# Patient Record
(demographics unavailable — no encounter records)

---

## 2024-11-10 NOTE — HISTORY OF PRESENT ILLNESS
[FreeTextEntry1] : 2024: SANTHOSH ROACH is a 36 year-old M who presents with low T over time and he has no interest in having children.  He started TRT and had his first injection of testosterone cypionate on 2023  He returns today for a follow up.  He had been doing injections at home, aided by wife who is a nurse He had his last injection on 8/15/2024  He notices slightly improved sex drive since last visit  Testosterone: - 8/15/2024 495 - 3/4/2024 218 - 10/6/2023 242 - 2023 210  H&H test on 3/4/2024 was done x1 day after testosterone injection.  HGB: 16.6 g/dL HCT: 52.3%  H&H test on 8/15/2024 was done x1 day before testosterone injection.  HB.3 g/dL HCT: 49.3%  2023 PSA 0.25 ng/mL

## 2024-11-10 NOTE — ASSESSMENT
[FreeTextEntry1] : Testosterone, H&H mid December We discussed the appropriate time for his H&H and understands to have it done 2-3 days before his next testosterone injection.   PSA  Pt will have lab workup with PCP in Jan/Feb   Order sent to replenish 22G and 18G Luer-Lock syringe supply for drawing up med and for administration  Aware of risks and need for repeat/regular blood work. We did talk about risk of "thick blood" and major cardiac events if not given and monitored appropriately, but overall major events are not increased.  Also, understands that IF a pt has prostate cancer, TRT can stimulate growth/progression of it.

## 2024-11-10 NOTE — ADDENDUM
[FreeTextEntry1] : This note was partly authored by Sam Radford working as a scribe for VIGNESH Cannon. I, VIGNESH Cannon, have reviewed the content of this note and confirm it is true and accurate. I personally performed the history and physical examination and made all the decisions. 11/07/2024.

## 2024-11-10 NOTE — HISTORY OF PRESENT ILLNESS
[FreeTextEntry1] : 2024: SANTHOSH ROACH is a 36 year-old M who presents with low T over time and he has no interest in having children.  He started TRT and had his first injection of testosterone cypionate on 2023  He returns today for a follow up.  He had been doing injections at home, aided by wife who is a nurse He had his last injection on 8/15/2024  He notices slightly improved sex drive since last visit  Testosterone: - 8/15/2024 495 - 3/4/2024 218 - 10/6/2023 242 - 2023 210  H&H test on 3/4/2024 was done x1 day after testosterone injection.  HGB: 16.6 g/dL HCT: 52.3%  H&H test on 8/15/2024 was done x1 day before testosterone injection.  HB.3 g/dL HCT: 49.3%  2023 PSA 0.25 ng/mL none

## 2025-03-09 NOTE — HISTORY OF PRESENT ILLNESS
[FreeTextEntry1] : 2025:  SANTHOSH ROACH is a 36-year-old Male who presents with low T over time and he has no interest in having more children.  He has two male children.  He started TRT and had his first injection of testosterone cypionate on 2023  He returns today for a follow up.  No medical changes since last visit.  He had been doing injections at home, aided by wife who is a nurse Last Injection was 25   Testosterone: - 25 791  - 8/15/2024 495 - 3/4/2024 218 - 10/6/2023 242 - 2023 210  H&H test on 2025 was done x2 day after testosterone injection.  HGB: 17.3 g/dL (elevated) HCT: 52.4% (elevated)  H&H test on 8/15/2024 was done x1 day before testosterone injection.  HB.3 g/dL HCT: 49.3%  PSA 0.24 ng/mL on 25 (stable)

## 2025-03-09 NOTE — ADDENDUM
[FreeTextEntry1] : This note was partly authored by Adan Banegas working as a scribe for VIGNESH Cannon. I, VIGNESH Cannon, have reviewed the content of this note and confirm it is true and accurate. I personally performed the history and physical examination and made all the decisions. 03/06/2025.

## 2025-03-09 NOTE — ASSESSMENT
[FreeTextEntry1] : Pt encouraged to see Hematology for elevated H&H H&H test on 2/27/2025 was done x2 day after testosterone injection.  HGB: 17.3 g/dL (elevated) HCT: 52.4% (elevated)  Repeat blood work today (testosterone and H&H)  Aware of risks and need for repeat/regular blood work. We did talk about risk of "thick blood" and major cardiac events if not given and monitored appropriately, but overall major events are not increased.  Also, understands that IF a pt has prostate cancer, TRT can stimulate growth/progression of it.  gave patient a referral to hematology

## 2025-03-14 NOTE — ASSESSMENT
[FreeTextEntry1] : 36 year old M with Pmhx ADHD and low testosterone, nicotine vaping  Presents for an evaluation of Erythrocytosis  He reports he has started to take testosterone levels due to low libido. He stopped last week to see if HGb HCT improve.  he is planning to start again on monday 3/17.  Overall he feels ok .  he was already referred to do a sleep study but due to timing he has not gone for consult.  He also vapes nicotine everyday.  most recent labs Mar 6  were HGB 17.3 HCT 51.4 %  and 2/27  hgb 17.3 HCT 52.4   Problem # 1 Erythrocytosis -of unknown etiology at this time, but the differential diagnosis includes mainly primary (proliferative) and secondary (reactive) causes - laboratory work-up also included hemochromatosis gene analysis, EPO, and testing for JAK2 mutation, MPN panel, testosterone levels. -CBC will be repeated at next visit -further management decisions including no intervention, prn phlebotomies, and oral pharmacotherapy will be based on pending results -also recommending sleep study to r.o CHERIE due to obesity.  -tasked [facility] email   lab orders and [facility] locations .  will call with results

## 2025-03-14 NOTE — HISTORY OF PRESENT ILLNESS
[de-identified] : cc: erythroytosis  36 year old M with Pmhx ADHD and low testosterone, nicotine vaping  Presents for an evaluation of Erythrocytosis  He reports he has started to take testosterone levels due to low libido. He stopped last week to see if HGb HCT improve.  he is planning to start again on monday 3/17.  Overall he feels ok .  he was already referred to do a sleep study but due to timing he has not gone for consult.  He also vapes nicotine everyday.  most recent labs Mar 6  were HGB 17.3 HCT 51.4 %  and   hgb 17.3 HCT 52.4    Denies excessive or spontaneous bleeding or bruising. Denies  enlarged nodes. Denies  dysuria,  nocturia,  hesitancy,  urgency, oliguria,  hematuria, incontinence. Denies  nausea,  vomiting,  diarrhea,  Constipation,  heartburn, abdominal pain,  jaundice, melena, blood in stool. Denies  palpitations,  dyspnea, orthopnea, PND, Denies claudication,  edema,  varicose veins, Denies Fever, rigors,  diaphoresis.  Denies anorexia,  weight loss, sleep disturbance.  Denies resting dyspnea, exertional dyspnea, wheezing, cough,  stridor,  hemoptysis, Denies rash,  pruritus,  skin lesions, Denies bone pain,  Myalgia,  arthralgia,   joint swelling,  limited range of motion, Patient does not complain of frequent or severe infections. Patient does not complain of any allergic reactions.   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   HCM:  Dr Leila Rodriguez- urologist  Dr Christoph rosario PCP sees  once a year colonoscopy/EGD : none COVID vax: yes  COVID infection: yes

## 2025-03-14 NOTE — REASON FOR VISIT
[Home] : at home, [unfilled] , at the time of the visit. [Other Location: e.g. Home (Enter Location, City,State)___] : at [unfilled] [Telehealth (audio & video)] : This visit was provided via telehealth using real-time 2-way audio visual technology. [Verbal consent obtained from patient] : the patient, [unfilled] [Initial Consultation] : an initial consultation for

## 2025-03-24 NOTE — REASON FOR VISIT
[Home] : at home, [unfilled] , at the time of the visit. [Other Location: e.g. Home (Enter Location, City,State)___] : at [unfilled] [Telehealth (audio & video)] : This visit was provided via telehealth using real-time 2-way audio visual technology. [Verbal consent obtained from patient] : the patient, [unfilled] [Follow-Up Visit] : a follow-up visit for [FreeTextEntry2] : erythrocytosis

## 2025-03-24 NOTE — HISTORY OF PRESENT ILLNESS
[de-identified] : cc: erythroytosis 36 year old M with Pmhx ADHD and low testosterone, nicotine vaping Presents for an evaluation of Erythrocytosis He reports he has started to take testosterone due to low libido and low testerone levels . He stopped last week to see if HGb HCT improve. he is planning to start again on monday 3/17. Overall he feels ok. he was already referred to do a sleep study but due to timing he has not gone for consult. He also vapes nicotine everyday. most recent labs Mar 6 were HGB 17.3 HCT 51.4 % and  hgb 17.3 HCT 52.4  Denies excessive or spontaneous bleeding or bruising. Denies enlarged nodes. Denies dysuria, nocturia, hesitancy, urgency, oliguria, hematuria, incontinence. Denies nausea, vomiting, diarrhea, Constipation, heartburn, abdominal pain, jaundice, melena, blood in stool. Denies palpitations, dyspnea, orthopnea, PND, Denies claudication, edema, varicose veins, Denies Fever, rigors, diaphoresis. Denies anorexia, weight loss, sleep disturbance. Denies resting dyspnea, exertional dyspnea, wheezing, cough, stridor, hemoptysis, Denies rash, pruritus, skin lesions, Denies bone pain, Myalgia, arthralgia, joint swelling, limited range of motion, Patient does not complain of frequent or severe infections. Patient does not complain of any allergic reactions.  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 HCM: Dr Leila Rodriguez- urologist Dr Christoph rosario PCP sees once a year colonoscopy/EGD : none COVID vax: yes COVID infection: yes. [de-identified] : 3/24/25 pt presents for fu for work up of erythrocytosis

## 2025-03-24 NOTE — ASSESSMENT
[FreeTextEntry1] : 36 year old M with Pmhx ADHD and low testosterone, nicotine vaping Presents for an evaluation of Erythrocytosis He reports he has started to take testosterone levels due to low libido. He stopped last week to see if HGb HCT improve. he is planning to start again on monday 3/17. Overall he feels ok. he was already referred to do a sleep study but due to timing he has not gone for consult. He also vapes nicotine everyday. most recent labs Mar 6 were HGB 17.3 HCT 51.4 % and 2/27 hgb 17.3 HCT 52.4  Problem # 1 Erythrocytosis -of unknown etiology at this time, but the differential diagnosis includes mainly primary (proliferative) and secondary (reactive) causes - laboratory work-up also included hemochromatosis gene analysis, EPO, and testing for JAK2 mutation, MPN panel, testosterone levels. -CBC will be repeated at next visit -further management decisions including no intervention, prn phlebotomies, and oral pharmacotherapy will be based on pending results -also recommending sleep study to r.o CHERIE due to obesity. -tasked [facility] email lab orders and [facility] locations. will call with results.  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  - Pt is aware of results and referral to Dr Segundo team .

## 2025-05-07 NOTE — ASSESSMENT
[FreeTextEntry1] : 36-year-old M with h/o ADHD and low testosterone, nicotine vaping presents for an evaluation of Erythrocytosis He reports he has started to take testosterone levels due to low libido. 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 Patient wants to continue Testosterone.    [] Secondary vs Primary polycythemia  - Unlikely to be MPL positive PV as PV is almost entirely JAK2 positive disease/condition - Low EPO level supports PV (primary) - Testosterone induced Erythrocytosis cannot be ruled out - H63D HFE mutation can also cause Erythrocytosis  - Any combination of the three can potentially be the etiology - Obtain Ferritin and iron studies  - May be a candidate for phlebotomy or blood donor  - RTC in 4 months

## 2025-05-07 NOTE — ASSESSMENT
[FreeTextEntry1] : I reached out to Hematology to see if any more detailed recommendations. I have ordered a T, CBC and PSA for 6 wks from now. He has rec'd refills for his syringes to draw the testosterone and for injection purposes.  Testosterone cypionate 200 mg/ml was prescribed:  he injects 0.5 ml q 7 d   Will call him w results.   He is also inquiring about vasectomy. It has been quite some time since I examined him and would need to see him and also obtain consent for the 30 d waiting period. He may need it done under sedation due to his body habitus.  Please note that he was at his place of residence and I was in the office at [facility] in [place]. Consent was given at beginning of call for a telehealth visit.

## 2025-05-07 NOTE — HISTORY OF PRESENT ILLNESS
[de-identified] : CHART REVIEW on 2025  cc: erythroytosis 36 year old M with Pmhx ADHD and low testosterone, nicotine vaping Presents for an evaluation of Erythrocytosis He reports he has started to take testosterone due to low libido and low testerone levels . He stopped last week to see if HGb HCT improve. he is planning to start again on monday 3/17. Overall he feels ok. he was already referred to do a sleep study but due to timing he has not gone for consult. He also vapes nicotine everyday. most recent labs Mar 6 were HGB 17.3 HCT 51.4 % and  hgb 17.3 HCT 52.4  Denies excessive or spontaneous bleeding or bruising. Denies enlarged nodes. Denies dysuria, nocturia, hesitancy, urgency, oliguria, hematuria, incontinence. Denies nausea, vomiting, diarrhea, Constipation, heartburn, abdominal pain, jaundice, melena, blood in stool. Denies palpitations, dyspnea, orthopnea, PND, Denies claudication, edema, varicose veins, Denies Fever, rigors, diaphoresis. Denies anorexia, weight loss, sleep disturbance. Denies resting dyspnea, exertional dyspnea, wheezing, cough, stridor, hemoptysis, Denies rash, pruritus, skin lesions, Denies bone pain, Myalgia, arthralgia, joint swelling, limited range of motion, Patient does not complain of frequent or severe infections. Patient does not complain of any allergic reactions.  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 HCM: Dr Leila Rodriguez- urologist Dr Christoph rosario PCP sees once a year colonoscopy/EGD : none COVID vax: yes COVID infection: yes. [de-identified] : 5/5/2025 Patient seen for the first time today for (?)secondary erythrocytosis (JAK2 V617F and JAK2 Exon 12-15 negative; MPL W515L positive). Patient has been on Testosterone. W/u showed he is also HFE H63D heterozygous. His HGB was around 17.3 HCT, MCV 52.4. He has no h/o thrombosis. He denies any pruritus, erythromelalgia, blurry vision or headache. He gets weekly doses of Testosterone. His last Testosterone was yesterday (5/4/2025), however, the one before yesterday was about 2 weeks (12 days) ago. Today his HGB is 15.9. He has no complaints.

## 2025-05-07 NOTE — HISTORY OF PRESENT ILLNESS
[FreeTextEntry1] : SANTHOSH ROACH is a 37 year old M who presents for a "telehealth appointment" with questions regarding frequency of TRT> He has had erythrocytosis from TRT and was sent to hematology for evaluations.  There was a genetic finding after which he was sent to a sub sub specialist for further evaluation. While donating blood may be an option, we are not sure what the safe cut off point would be in terms of how high it can safely go, before donating PRBC's makes sense. He is due to undergo further blood testing next wk for the hematologist. He states his energy is variable and he feels a little better on testosterone, but at times doesn't notice a difference.  T levels have been in a good range and therefore, likely unrelated to his low T.  Also, of note, is that we had gone to a q 10 d dosing schedule and the h/h did improve. He states that the hematologist is ok w him being on a q 7 d schedule, and he is therefore due for next dose in 3 d from now, after which he will go back to q 7 d dosing.  Lastly, he is inquiring about vasectomy.

## 2025-05-07 NOTE — RESULTS/DATA
[FreeTextEntry1] : 5/5/2025 Secondary erythrocytosis  EPO 4.8 JAK2 V617F and JAK2 Exon 12-15 negative  MPL W515L positive H63D Heterozygous  HGB/HCT max 17.3/52.4  H/H today 15.9/47.9 Ferritin 212 (2020)

## 2025-05-07 NOTE — HISTORY OF PRESENT ILLNESS
[de-identified] : CHART REVIEW on 2025  cc: erythroytosis 36 year old M with Pmhx ADHD and low testosterone, nicotine vaping Presents for an evaluation of Erythrocytosis He reports he has started to take testosterone due to low libido and low testerone levels . He stopped last week to see if HGb HCT improve. he is planning to start again on monday 3/17. Overall he feels ok. he was already referred to do a sleep study but due to timing he has not gone for consult. He also vapes nicotine everyday. most recent labs Mar 6 were HGB 17.3 HCT 51.4 % and  hgb 17.3 HCT 52.4  Denies excessive or spontaneous bleeding or bruising. Denies enlarged nodes. Denies dysuria, nocturia, hesitancy, urgency, oliguria, hematuria, incontinence. Denies nausea, vomiting, diarrhea, Constipation, heartburn, abdominal pain, jaundice, melena, blood in stool. Denies palpitations, dyspnea, orthopnea, PND, Denies claudication, edema, varicose veins, Denies Fever, rigors, diaphoresis. Denies anorexia, weight loss, sleep disturbance. Denies resting dyspnea, exertional dyspnea, wheezing, cough, stridor, hemoptysis, Denies rash, pruritus, skin lesions, Denies bone pain, Myalgia, arthralgia, joint swelling, limited range of motion, Patient does not complain of frequent or severe infections. Patient does not complain of any allergic reactions.  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 HCM: Dr Leila Rodriguez- urologist Dr Christoph rosario PCP sees once a year colonoscopy/EGD : none COVID vax: yes COVID infection: yes. [de-identified] : 5/5/2025 Patient seen for the first time today for (?)secondary erythrocytosis (JAK2 V617F and JAK2 Exon 12-15 negative; MPL W515L positive). Patient has been on Testosterone. W/u showed he is also HFE H63D heterozygous. His HGB was around 17.3 HCT, MCV 52.4. He has no h/o thrombosis. He denies any pruritus, erythromelalgia, blurry vision or headache. He gets weekly doses of Testosterone. His last Testosterone was yesterday (5/4/2025), however, the one before yesterday was about 2 weeks (12 days) ago. Today his HGB is 15.9. He has no complaints.